# Patient Record
Sex: MALE | Race: WHITE | NOT HISPANIC OR LATINO | Employment: PART TIME | URBAN - METROPOLITAN AREA
[De-identification: names, ages, dates, MRNs, and addresses within clinical notes are randomized per-mention and may not be internally consistent; named-entity substitution may affect disease eponyms.]

---

## 2021-08-05 ENCOUNTER — OFFICE VISIT (OUTPATIENT)
Dept: GASTROENTEROLOGY | Facility: CLINIC | Age: 31
End: 2021-08-05

## 2021-08-05 VITALS
HEIGHT: 75 IN | TEMPERATURE: 98 F | DIASTOLIC BLOOD PRESSURE: 72 MMHG | SYSTOLIC BLOOD PRESSURE: 125 MMHG | BODY MASS INDEX: 27.03 KG/M2 | WEIGHT: 217.4 LBS

## 2021-08-05 DIAGNOSIS — K56.1 JEJUNAL INTUSSUSCEPTION (HCC): Primary | ICD-10-CM

## 2021-08-05 DIAGNOSIS — K21.9 GASTROESOPHAGEAL REFLUX DISEASE, UNSPECIFIED WHETHER ESOPHAGITIS PRESENT: Chronic | ICD-10-CM

## 2021-08-05 DIAGNOSIS — R10.33 PERIUMBILICAL ABDOMINAL PAIN: Chronic | ICD-10-CM

## 2021-08-05 DIAGNOSIS — K59.01 SLOW TRANSIT CONSTIPATION: Chronic | ICD-10-CM

## 2021-08-05 PROCEDURE — 99204 OFFICE O/P NEW MOD 45 MIN: CPT | Performed by: INTERNAL MEDICINE

## 2021-08-05 RX ORDER — PANTOPRAZOLE SODIUM 40 MG/1
40 TABLET, DELAYED RELEASE ORAL
Qty: 30 TABLET | Refills: 1 | Status: SHIPPED | OUTPATIENT
Start: 2021-08-05

## 2021-08-05 NOTE — PROGRESS NOTES
Chief Complaint   Patient presents with   • Abdominal Pain   • Constipation   • Heartburn       Subjective     HPI    Martin Ventura is a 31 y.o. male with a past medical history noted below who presents for evaluation of mid abdominal pain, chronic GERD symptoms, chronic constipation and CT findings of jejunal intussusception.    He says GERD is been a chronic problem.  He was seen by gastroenterologist before, it sounds like at the [facility].  He reports an EGD many years ago it was told that he had GERD.  He was put on omeprazole but says that it worsened his constipation and gave him impotence.  He has been on Pepcid but that worsen his constipation as well.    He does have chronic mid abdominal pain.  It comes and goes.  It can be sharp at times.  It is not keeping him from eating or drinking    He does have chronic constipation.  Bowel movements every few days that are hard and difficult to pass.  He feels incomplete evacuation.  He is currently taking MiraLAX which helps minimally but only at 1-2 doses a day.  He took Linzess but that worsened his reflux.  Trulance worked too well and cleaned him out giving him excessive stools    He had CT imaging as part of work-up with his primary care doctor and note was made of mild stool burden, 2 areas of nonobstructive jejunal intussusception.    No family hx GI malignancy    Hernia repair as a chiled    No smoking, no excess ETOH.     Works at [employer].    Today's visit was in the office.  Both the patient and I were wearing face masks and proper hand hygiene was performed before and after the physical exam.           Current Outpatient Medications:   •  pantoprazole (PROTONIX) 40 MG EC tablet, Take 1 tablet by mouth Every Morning Before Breakfast., Disp: 30 tablet, Rfl: 1      Objective     Vitals:    08/05/21 1203   BP: 125/72   Temp: 98 °F (36.7 °C)         08/05/21  1203   Weight: 98.6 kg (217 lb 6.4 oz)     Body mass index is 27.17 kg/m².    Physical Exam  Vitals  reviewed.   Constitutional:       General: He is not in acute distress.     Appearance: Normal appearance. He is well-developed. He is not diaphoretic.   HENT:      Head: Normocephalic.   Neck:      Thyroid: No thyromegaly.   Cardiovascular:      Rate and Rhythm: Normal rate and regular rhythm.   Pulmonary:      Effort: Pulmonary effort is normal. No respiratory distress.      Breath sounds: Normal breath sounds. No wheezing.   Abdominal:      General: Bowel sounds are normal. There is no distension.      Palpations: Abdomen is soft. Abdomen is not rigid. There is no mass.      Tenderness: There is no abdominal tenderness. There is no guarding or rebound.      Hernia: No hernia is present.   Genitourinary:     Comments: Rectal exam deferred  Musculoskeletal:         General: No tenderness.   Neurological:      Mental Status: He is alert and oriented to person, place, and time.      Motor: No atrophy.      Coordination: Coordination normal.   Psychiatric:         Behavior: Behavior normal.         Thought Content: Thought content normal.         Judgment: Judgment normal.               I personally reviewed data as detailed below:     The following labs--normal CBC, CMP, scanned in from 6/22/2021 with his PCP    The radiology studies as follows: CT of the abdomen pelvis 6/30/2021 showing 2 nonobstructive areas of jejunal intussusception, mild stool burden, mild splenomegaly likely normal for body height    Office notes from: 6/22/2021 PCP note      No notes on file    Assessment/Plan    1.  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  Suspect benign transitory process but given his mid abdominal pain need further evaluation of this    2.  GERD: Chronic issue: He is only tried 1 PPI which gave him side effects, one H2 blocker which worsen his constipation.  Will need to try an alternative medication    3.  Chronic constipation: MiraLAX seems to be working but I think we need a higher dose    4.  Periumbilical  abdominal pain: Possibly related to #1 versus #2 in 3    Plan  Encouraged increasing MiraLAX to 2 doses daily with option to increase beyond that  Trial of pantoprazole for his GERD symptoms  Small bowel follow-through to assess the jejunal intussusception.  We discussed symptoms of small bowel obstruction and indications to go to the emergency room  Further recommendations following imaging    Diagnoses and all orders for this visit:    1. Jejunal intussusception (CMS/HCC) (Primary)  -     FL small bowel follow through; Future    2. Gastroesophageal reflux disease, unspecified whether esophagitis present  -     FL small bowel follow through; Future    3. Slow transit constipation    4. Periumbilical abdominal pain    Other orders  -     pantoprazole (PROTONIX) 40 MG EC tablet; Take 1 tablet by mouth Every Morning Before Breakfast.  Dispense: 30 tablet; Refill: 1        I have discussed the above plan with the patient.  They verbalize understanding and are in agreement with the plan.  They have been advised to contact the office for any questions, concerns, or changes related to their health.    Dictated utilizing Dragon dictation

## 2021-08-23 ENCOUNTER — HOSPITAL ENCOUNTER (OUTPATIENT)
Dept: GENERAL RADIOLOGY | Facility: HOSPITAL | Age: 31
Discharge: HOME | End: 2021-08-23
Admitting: INTERNAL MEDICINE

## 2021-08-23 DIAGNOSIS — K21.9 GASTROESOPHAGEAL REFLUX DISEASE, UNSPECIFIED WHETHER ESOPHAGITIS PRESENT: Chronic | ICD-10-CM

## 2021-08-23 DIAGNOSIS — K56.1 JEJUNAL INTUSSUSCEPTION (HCC): ICD-10-CM

## 2021-08-23 PROCEDURE — 74250 X-RAY XM SM INT 1CNTRST STD: CPT

## 2021-08-23 RX ADMIN — BARIUM SULFATE 366 ML: 960 POWDER, FOR SUSPENSION ORAL at 10:05

## 2021-08-27 ENCOUNTER — TELEPHONE (OUTPATIENT)
Dept: GASTROENTEROLOGY | Facility: CLINIC | Age: 31
End: 2021-08-27

## 2021-08-27 NOTE — TELEPHONE ENCOUNTER
"Call to pt.  Advise per DR Burger note.  Verb understanding.     States concerned that still has unexplained abd pain - \"what do we do next?\"    Advise Dr Burger out of office -will send message. Verb understanding.   "

## 2021-08-27 NOTE — TELEPHONE ENCOUNTER
----- Message from Zoë Burger MD sent at 8/26/2021  3:37 PM EDT -----  The small bowel follow-through was normal.  No identification of intussusception.

## 2021-08-31 NOTE — TELEPHONE ENCOUNTER
He should taking MiraLAX as we discussed in the office to prevent any constipation issues whatsoever.  This must be treated first to see how his abdominal symptoms are once this is treated.  Also he should be continuing the pantoprazole.    If he is adequately stooling for at least 2 weeks and taking the pantoprazole daily and still having consistent symptoms, then we can consider alternative medications for him.

## 2021-09-02 RX ORDER — PLECANATIDE 3 MG/1
1 TABLET ORAL DAILY
Qty: 30 TABLET | Refills: 1 | Status: SHIPPED | OUTPATIENT
Start: 2021-09-02

## 2021-09-08 RX ORDER — SUCRALFATE 1 G/1
1 TABLET ORAL 4 TIMES DAILY
Qty: 120 TABLET | Refills: 1 | Status: SHIPPED | OUTPATIENT
Start: 2021-09-08

## 2022-10-12 RX ORDER — PLECANATIDE 3 MG/1
TABLET ORAL
Qty: 30 TABLET | Refills: 1 | OUTPATIENT
Start: 2022-10-12